# Patient Record
Sex: FEMALE | Race: OTHER
[De-identification: names, ages, dates, MRNs, and addresses within clinical notes are randomized per-mention and may not be internally consistent; named-entity substitution may affect disease eponyms.]

---

## 2021-03-02 ENCOUNTER — HOSPITAL ENCOUNTER (OUTPATIENT)
Dept: HOSPITAL 76 - DI | Age: 41
Discharge: HOME | End: 2021-03-02
Attending: PHYSICIAN ASSISTANT
Payer: COMMERCIAL

## 2021-03-02 DIAGNOSIS — M47.812: ICD-10-CM

## 2021-03-02 DIAGNOSIS — M51.34: Primary | ICD-10-CM

## 2021-03-02 DIAGNOSIS — M50.31: ICD-10-CM

## 2021-03-02 DIAGNOSIS — M50.223: ICD-10-CM

## 2021-03-02 DIAGNOSIS — M48.02: ICD-10-CM

## 2021-03-02 NOTE — MRI REPORT
PROCEDURE:  Thoracic Spine W/O

 

INDICATIONS:  THORACIC PAIN, CERVICALGIA

 

TECHNIQUE:  

Noncontrast sagittal T1 spine echo and T2 fast spin echo, sagittal STIR, axial T1 and T2 fast spin ec
ho through the thoracic spine.  

 

COMPARISON:  MRI cervical spine 3/2/2021

 

FINDINGS:  

Image quality:  Excellent.  

 

Alignment and Curvature:  There is normal bony alignment.  

 

Bone Marrow:  Marrow is of normal overall signal.  No acute vertebral body compression fractures.  

 

Spinal Cord:  Visualized spinal cord is normal in size and signal.  

 

Paraspinous Soft Tissues:  No paravertebral masses.  

 

Miscellaneous:  On axial images, central canal and foramina appear widely patent at all scanned level
s.  Multilevel disc desiccation is present throughout the thoracic spine. Trace disc bulge is present
 at T6-7, T7-8.

 

IMPRESSION:  Minimal disc bulges within the mid thoracic spine as above. Otherwise, unremarkable.

 

Reviewed by: Soniya Argueta MD on 3/2/2021 3:21 PM PST

Approved by: Soniya Argueta MD on 3/2/2021 3:21 PM PST

 

 

Station ID:  [id number]

## 2021-03-02 NOTE — MRI REPORT
PROCEDURE:  Cervical Spine W/O

 

INDICATIONS:  THORACIC PAIN, CERVICALGIA

 

TECHNIQUE:  

Noncontrast sagittal T1 spin echo and T2 fast spin echo, sagittal STIR, foraminal oblique sagittal T2
 fast spin echo, and axial gradient echo or T2 fast spin echo through the cervical spine.  

 

COMPARISON:  MRI thoracic spine 3/2/2021

 

FINDINGS:  

Image quality:  Excellent.  

 

Alignment and Curvature:  There is trace retrolisthesis of C5 on C6.

 

Bone Marrow:  Marrow demonstrates normal overall signal.  

 

Spinal Cord:  Visualized spinal cord has normal size and signal.  No cerebellar tonsillar herniation.
  

 

Paraspinous Soft Tissues:  No paravertebral masses.  Prevertebral soft tissues are normal in thicknes
s.  

 

Discs: Minimal to mild desiccation is present throughout the cervical spine most notable C5-6 and C6-
7.

 

C2-C3:  No disc bulge, spinal stenosis or foraminal narrowing.  

 

C3-C4:   Minimal disc bulge without spinal stenosis. Minimal bilateral foraminal narrowing.

 

C4-C5:  No disc bulge or spinal stenosis. Mild left foraminal narrowing with uncovertebral hypertroph
y.

 

C5-C6:  Mild disc bulge with minimal to mild spinal stenosis. Mild left foraminal narrowing with unco
vertebral hypertrophy.

 

C6-C7:  Mild disc bulge with posterior central protrusion. There is slight indentation of the anterio
r thecal sac. Minimal to mild left and minimal right foraminal narrowing.

 

C7-T1:  Mild left asymmetric disc bulge without spinal stenosis or foraminal narrowing.

 

 

IMPRESSION:  

 

1. Minimal to mild disc bulges.

 

 

2. Minimal to mild foraminal narrowing most notable at C4-5, C5-6 and C6-7.

 

Reviewed by: Soniya Argueta MD on 3/2/2021 3:06 PM PST

Approved by: Soniya Argueta MD on 3/2/2021 3:06 PM PST

 

 

Station ID:  [id number]